# Patient Record
Sex: FEMALE | Race: WHITE
[De-identification: names, ages, dates, MRNs, and addresses within clinical notes are randomized per-mention and may not be internally consistent; named-entity substitution may affect disease eponyms.]

---

## 2018-03-05 ENCOUNTER — CHARTING TRANS (OUTPATIENT)
Dept: OTHER | Age: 33
End: 2018-03-05

## 2018-03-05 ASSESSMENT — PAIN SCALES - GENERAL: PAINLEVEL_OUTOF10: 0

## 2018-11-01 VITALS
RESPIRATION RATE: 16 BRPM | WEIGHT: 234.59 LBS | TEMPERATURE: 97.4 F | SYSTOLIC BLOOD PRESSURE: 130 MMHG | BODY MASS INDEX: 37.7 KG/M2 | OXYGEN SATURATION: 99 % | DIASTOLIC BLOOD PRESSURE: 80 MMHG | HEIGHT: 66 IN | HEART RATE: 80 BPM

## 2020-04-22 ENCOUNTER — TELEPHONE (OUTPATIENT)
Dept: OTOLARYNGOLOGY | Facility: CLINIC | Age: 35
End: 2020-04-22

## 2020-04-22 ENCOUNTER — OFFICE VISIT (OUTPATIENT)
Dept: OTOLARYNGOLOGY | Facility: CLINIC | Age: 35
End: 2020-04-22
Payer: COMMERCIAL

## 2020-04-22 VITALS — HEART RATE: 90 BPM | TEMPERATURE: 97 F | SYSTOLIC BLOOD PRESSURE: 134 MMHG | DIASTOLIC BLOOD PRESSURE: 88 MMHG

## 2020-04-22 DIAGNOSIS — H69.82 ETD (EUSTACHIAN TUBE DYSFUNCTION), LEFT: ICD-10-CM

## 2020-04-22 DIAGNOSIS — K21.9 LARYNGOPHARYNGEAL REFLUX (LPR): Primary | ICD-10-CM

## 2020-04-22 PROCEDURE — 31575 DIAGNOSTIC LARYNGOSCOPY: CPT | Performed by: OTOLARYNGOLOGY

## 2020-04-22 PROCEDURE — 99243 OFF/OP CNSLTJ NEW/EST LOW 30: CPT | Performed by: OTOLARYNGOLOGY

## 2020-04-22 RX ORDER — AMLODIPINE BESYLATE 5 MG/1
5 TABLET ORAL DAILY
COMMUNITY
Start: 2020-02-04 | End: 2021-02-18

## 2020-04-22 RX ORDER — OMEPRAZOLE 40 MG/1
40 CAPSULE, DELAYED RELEASE ORAL DAILY
Qty: 30 CAPSULE | Refills: 11 | Status: SHIPPED | OUTPATIENT
Start: 2020-04-22 | End: 2020-05-22

## 2020-04-22 RX ORDER — FLUTICASONE PROPIONATE 50 MCG
1 SPRAY, SUSPENSION (ML) NASAL 2 TIMES DAILY
Qty: 1 BOTTLE | Refills: 11 | Status: SHIPPED | OUTPATIENT
Start: 2020-04-22

## 2020-04-22 RX ORDER — LOSARTAN POTASSIUM 25 MG/1
25 TABLET ORAL DAILY
COMMUNITY
Start: 2020-04-10 | End: 2021-02-18

## 2020-04-22 NOTE — TELEPHONE ENCOUNTER
Pt advised per , pt to auto inflate ears 20-30 times a day, use nasal spray and if no better in 1 week , pt to contact our office. Pt verbalized understanding.

## 2020-04-22 NOTE — PROGRESS NOTES
Chip Morgan is a 38year old female.   Patient presents with:  Hearing Loss  Throat Problem: pt feels like something is stuck inn throat       HISTORY OF PRESENT ILLNESS  4/22/2020  Patient presents for evaluation of a two week history of left sided pluggin Cardio Negative Chest pain, irregular heartbeat/palpitations and syncope. GI Negative Abdominal pain and diarrhea. Endocrine Negative Cold intolerance and heat intolerance. Neuro Negative Tremors. Psych Negative Anxiety and depression.    Integume patient stated that they understood and wished for me to proceed. Topical pontocaine and neosynephrine was instilled into the bilateral nasal cavities.  The flexible fiberoptic laryngoscope was threaded into the left nasal cavity and threaded into the nasop patient. Treatment methods were discussed at length including: antihistamines, nasal steroids, decongestants, autoinsufflation and tympanostomy tubes.   Given that this is the first time she has had a problem like this I recommend that she simply auto insuf

## 2021-02-18 ENCOUNTER — OFFICE VISIT (OUTPATIENT)
Dept: OBGYN CLINIC | Facility: CLINIC | Age: 36
End: 2021-02-18
Payer: COMMERCIAL

## 2021-02-18 ENCOUNTER — LAB ENCOUNTER (OUTPATIENT)
Dept: LAB | Facility: REFERENCE LAB | Age: 36
End: 2021-02-18
Attending: OBSTETRICS & GYNECOLOGY
Payer: COMMERCIAL

## 2021-02-18 VITALS
SYSTOLIC BLOOD PRESSURE: 124 MMHG | WEIGHT: 252 LBS | HEIGHT: 66 IN | BODY MASS INDEX: 40.5 KG/M2 | DIASTOLIC BLOOD PRESSURE: 80 MMHG

## 2021-02-18 DIAGNOSIS — N93.9 ABNORMAL UTERINE BLEEDING: Primary | ICD-10-CM

## 2021-02-18 DIAGNOSIS — N93.9 ABNORMAL UTERINE BLEEDING: ICD-10-CM

## 2021-02-18 DIAGNOSIS — O10.019 BENIGN ESSENTIAL HYPERTENSION, ANTEPARTUM: ICD-10-CM

## 2021-02-18 DIAGNOSIS — T30.4 CHEMICAL BURN: ICD-10-CM

## 2021-02-18 LAB
DEPRECATED RDW RBC AUTO: 40 FL (ref 35.1–46.3)
ERYTHROCYTE [DISTWIDTH] IN BLOOD BY AUTOMATED COUNT: 13.4 % (ref 11–15)
FSH SERPL-ACNC: 3.1 MIU/ML
HCT VFR BLD AUTO: 40.5 %
HGB BLD-MCNC: 13.2 G/DL
LH SERPL-ACNC: 1.7 MIU/ML
MCH RBC QN AUTO: 27.3 PG (ref 26–34)
MCHC RBC AUTO-ENTMCNC: 32.6 G/DL (ref 31–37)
MCV RBC AUTO: 83.7 FL
PLATELET # BLD AUTO: 306 10(3)UL (ref 150–450)
PROLACTIN SERPL-MCNC: 19.4 NG/ML
RBC # BLD AUTO: 4.84 X10(6)UL
TSI SER-ACNC: 0.57 MIU/ML (ref 0.36–3.74)
WBC # BLD AUTO: 8.7 X10(3) UL (ref 4–11)

## 2021-02-18 PROCEDURE — 3074F SYST BP LT 130 MM HG: CPT | Performed by: OBSTETRICS & GYNECOLOGY

## 2021-02-18 PROCEDURE — 99204 OFFICE O/P NEW MOD 45 MIN: CPT | Performed by: OBSTETRICS & GYNECOLOGY

## 2021-02-18 PROCEDURE — 3008F BODY MASS INDEX DOCD: CPT | Performed by: OBSTETRICS & GYNECOLOGY

## 2021-02-18 PROCEDURE — 3079F DIAST BP 80-89 MM HG: CPT | Performed by: OBSTETRICS & GYNECOLOGY

## 2021-02-18 PROCEDURE — 84146 ASSAY OF PROLACTIN: CPT | Performed by: OBSTETRICS & GYNECOLOGY

## 2021-02-18 PROCEDURE — 83001 ASSAY OF GONADOTROPIN (FSH): CPT

## 2021-02-18 PROCEDURE — 85027 COMPLETE CBC AUTOMATED: CPT

## 2021-02-18 PROCEDURE — 36415 COLL VENOUS BLD VENIPUNCTURE: CPT

## 2021-02-18 PROCEDURE — 84443 ASSAY THYROID STIM HORMONE: CPT

## 2021-02-18 PROCEDURE — 83002 ASSAY OF GONADOTROPIN (LH): CPT

## 2021-02-18 PROCEDURE — 83520 IMMUNOASSAY QUANT NOS NONAB: CPT

## 2021-02-18 RX ORDER — DOXYCYCLINE HYCLATE 100 MG/1
100 CAPSULE ORAL 2 TIMES DAILY
COMMUNITY
Start: 2021-02-14

## 2021-02-18 RX ORDER — MEDROXYPROGESTERONE ACETATE 10 MG/1
10 TABLET ORAL DAILY
Qty: 10 TABLET | Refills: 0 | Status: SHIPPED | OUTPATIENT
Start: 2021-02-18 | End: 2021-02-28

## 2021-02-18 RX ORDER — OMEPRAZOLE 40 MG/1
CAPSULE, DELAYED RELEASE ORAL
COMMUNITY
Start: 2020-05-24

## 2021-02-18 NOTE — PATIENT INSTRUCTIONS
to get semen analysis at [facility]. Referral given. [place] Restart your blood pressure medicines.  Consult with PCP about changing bp meds if wants to get pregnant    Attempt pregnancy x 6 M and if not pregnant refer for testing an
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
## 2021-02-22 LAB — ANTI-MULLERIAN HORMONE: 2.1 NG/ML

## (undated) NOTE — LETTER
[facility] [address] [facility]       04/22/20        Patient: Gil Noble   YOB: 1985   Date of Visit: 4/22/2020       Dear  Dr. Jeison Shoemaker MD,      Thank you for referring Gil Noble to my practice.    She